# Patient Record
Sex: FEMALE | Race: WHITE | NOT HISPANIC OR LATINO | Employment: UNEMPLOYED | URBAN - NONMETROPOLITAN AREA
[De-identification: names, ages, dates, MRNs, and addresses within clinical notes are randomized per-mention and may not be internally consistent; named-entity substitution may affect disease eponyms.]

---
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
## 2025-05-23 ENCOUNTER — OFFICE VISIT (OUTPATIENT)
Dept: INTERNAL MEDICINE | Facility: CLINIC | Age: 20
End: 2025-05-23
Payer: COMMERCIAL

## 2025-05-23 VITALS
SYSTOLIC BLOOD PRESSURE: 113 MMHG | BODY MASS INDEX: 28.89 KG/M2 | HEART RATE: 76 BPM | TEMPERATURE: 98 F | DIASTOLIC BLOOD PRESSURE: 76 MMHG | RESPIRATION RATE: 14 BRPM | WEIGHT: 157 LBS | HEIGHT: 62 IN | OXYGEN SATURATION: 98 %

## 2025-05-23 DIAGNOSIS — Z00.00 ROUTINE GENERAL MEDICAL EXAMINATION AT A HEALTH CARE FACILITY: ICD-10-CM

## 2025-05-23 DIAGNOSIS — E78.01 FAMILIAL HYPERCHOLESTEROLEMIA: Primary | ICD-10-CM

## 2025-05-23 RX ORDER — ROSUVASTATIN CALCIUM 10 MG/1
10 TABLET, COATED ORAL DAILY
Qty: 90 TABLET | Refills: 1 | Status: SHIPPED | OUTPATIENT
Start: 2025-05-23

## 2025-05-23 NOTE — PROGRESS NOTES
Subjective     Chief Complaint   Patient presents with    Hyperlipidemia       History of Present Illness  The patient is a 20-year-old female who presents for evaluation of acne and medication management.    She has been adhering to her prescribed medication regimen, with no reported side effects. She reports no significant muscle pain. She is currently residing in [place] and has successfully completed all her academic courses. She is not sexually active and does not require birth control. Her menstrual cycles are regular. She is not fasting today. She is not experiencing any chest pain or shortness of breath. Her appetite, hydration, urination, and bowel movements are normal. She is currently seeking employment in a clinical setting. She is planning a trip to [facility]. She has requested a refill of her Crestor prescription, which she has been taking approximately 75% of the time.    She has discontinued the use of minocycline and attributes her current acne breakouts to inconsistent facial hygiene rather than a serious underlying condition.      Otherwise complete ROS reviewed and negative except as mentioned in the HPI.    Past Medical History:   Past Medical History:   Diagnosis Date    Hyperlipidemia      Past Surgical History:History reviewed. No pertinent surgical history.  Social History:  reports that she has never smoked. She has never used smokeless tobacco. She reports that she does not drink alcohol and does not use drugs.    Family History: She was adopted. Family history is unknown by patient.       Allergies:  No Known Allergies  Medications:  Prior to Admission medications    Medication Sig Start Date End Date Taking? Authorizing Provider   minocycline (MINOCIN,DYNACIN) 100 MG capsule Take 1 capsule by mouth 2 (Two) Times a Day.  Patient not taking: Reported on 5/23/2025 12/30/24   Callie Zavala APRN   rosuvastatin (CRESTOR) 10 MG tablet Take 1 tablet by mouth Daily. 12/30/24  "Sally Calliechano Malik, ANN       Objective     Vital Signs: /76 (BP Location: Right arm, Patient Position: Sitting, Cuff Size: Adult)   Pulse 76   Temp 98 °F (36.7 °C) (Temporal)   Resp 14   Ht 157.6 cm (62.03\")   Wt 71.2 kg (157 lb)   SpO2 98%   BMI 28.69 kg/m²     Physical Exam  Skin: Face appears clear with no significant acne lesions.    Physical Exam  Vitals reviewed.   Constitutional:       Appearance: Normal appearance. She is well-developed.   HENT:      Head: Normocephalic and atraumatic.   Eyes:      Pupils: Pupils are equal, round, and reactive to light.   Neck:      Vascular: No JVD.   Cardiovascular:      Rate and Rhythm: Normal rate and regular rhythm.      Heart sounds: Normal heart sounds.   Pulmonary:      Effort: Pulmonary effort is normal.      Breath sounds: Normal breath sounds.   Abdominal:      General: Bowel sounds are normal.      Palpations: Abdomen is soft.   Musculoskeletal:         General: No deformity.      Cervical back: Normal range of motion and neck supple.   Lymphadenopathy:      Cervical: No cervical adenopathy.   Skin:     General: Skin is warm and dry.   Neurological:      Mental Status: She is alert and oriented to person, place, and time.   Psychiatric:         Behavior: Behavior normal.         Thought Content: Thought content normal.         Judgment: Judgment normal.       BMI is >= 25 and <30. (Overweight) The following options were offered after discussion;: exercise counseling/recommendations and nutrition counseling/recommendations      Results Reviewed:  Glucose   Date Value Ref Range Status   11/26/2024 85 70 - 99 mg/dL Final     BUN   Date Value Ref Range Status   11/26/2024 15 6 - 20 mg/dL Final     Creatinine   Date Value Ref Range Status   11/26/2024 0.92 0.57 - 1.00 mg/dL Final     Sodium   Date Value Ref Range Status   11/26/2024 141 134 - 144 mmol/L Final     Potassium   Date Value Ref Range Status   11/26/2024 4.0 3.5 - 5.2 mmol/L Final "     Chloride   Date Value Ref Range Status   11/26/2024 104 96 - 106 mmol/L Final     Total CO2   Date Value Ref Range Status   11/26/2024 21 20 - 29 mmol/L Final     Calcium   Date Value Ref Range Status   11/26/2024 9.7 8.7 - 10.2 mg/dL Final     ALT (SGPT)   Date Value Ref Range Status   11/26/2024 14 0 - 32 IU/L Final     AST (SGOT)   Date Value Ref Range Status   11/26/2024 19 0 - 40 IU/L Final     WBC   Date Value Ref Range Status   02/22/2024 10.5 3.4 - 10.8 x10E3/uL Final     Hematocrit   Date Value Ref Range Status   02/22/2024 41.6 34.0 - 46.6 % Final     Platelets   Date Value Ref Range Status   02/22/2024 358 150 - 450 x10E3/uL Final     Total Cholesterol   Date Value Ref Range Status   06/17/2019 227 (H) 130 - 200 mg/dL Final     Triglycerides   Date Value Ref Range Status   11/26/2024 105 (H) 0 - 89 mg/dL Final   12/19/2022 64 <=149 mg/dL Final     HDL Cholesterol   Date Value Ref Range Status   11/26/2024 50 >39 mg/dL Final   12/19/2022 61 >=50 mg/dL Final     Comment:     Levels of HDL cholesterol above 60 mg/dL are considered a negative risk factor for coronary heart disease.   Source: NCEP ATP III Expert report, SAULO 2001; 285(19):2486-97; Circulation, 2002;106:3143.     LDL Cholesterol    Date Value Ref Range Status   12/19/2022 168 (H) 1 - 129 mg/dL Final     Comment:     NCEP LDL and Non-HDL Goals for Three Risk Categories*           Risk Category                LDL Goal      Non-HDL Goal                                      (mg/dL)         (mg/dL)    CHD and CHD Risk Equivalent  (10-year risk for CHD >20%)         (<100)          (<130)    Multiple (2+) Risk Factors and  10-year risk </=20%                 (<130)          (<160)    0-1 Risk Factor                     (<160)          (<190)           *Adapted from NCEP ATP III Guidelines.     LDL Chol Calc (NIH)   Date Value Ref Range Status   11/26/2024 194 (H) 0 - 109 mg/dL Final     LDL/HDL Ratio   Date Value Ref Range Status   06/17/2019  3.22  Final     Hemoglobin A1C   Date Value Ref Range Status   02/22/2024 5.4 4.8 - 5.6 % Final     Comment:              Prediabetes: 5.7 - 6.4           Diabetes: >6.4           Glycemic control for adults with diabetes: <7.0         Results        Assessment / Plan     Assessment/Plan:  Diagnoses and all orders for this visit:    1. Familial hypercholesterolemia (Primary)  -     rosuvastatin (CRESTOR) 10 MG tablet; Take 1 tablet by mouth Daily.  Dispense: 90 tablet; Refill: 1         Assessment & Plan  1. Medication Management.  - Weight loss of 3 pounds since November 2024.  - Currently taking Crestor with no major side effects reported.  - Discussion about the importance of compliance and potential liver impact when combined with alcohol.  - A 90-day refill of Crestor has been provided. Blood work, including A1c and cholesterol levels, will be conducted around 06/01/2025.    2. Acne.  - Occasional breakouts reported due to not washing face regularly.  - No current use of minocycline.  - Face appears clear with no significant issues noted.  - No new acne treatment prescribed.      No follow-ups on file. unless patient needs to be seen sooner or acute issues arise.    Code Status: Full  Patient or patient representative verbalized consent for the use of Ambient Listening during the visit with  ANN Iraheta for chart documentation. 5/23/2025  12:53 CDT  I have discussed the patient results/orders and and plan/recommendation with them at today's visit.      Signed by:    ANN Iraheta Date: 05/23/25

## 2025-08-27 DIAGNOSIS — E78.01 FAMILIAL HYPERCHOLESTEROLEMIA: ICD-10-CM

## 2025-08-27 RX ORDER — ROSUVASTATIN CALCIUM 10 MG/1
10 TABLET, COATED ORAL DAILY
Qty: 90 TABLET | Refills: 1 | OUTPATIENT
Start: 2025-08-27